# Patient Record
Sex: MALE | Race: BLACK OR AFRICAN AMERICAN | NOT HISPANIC OR LATINO | URBAN - METROPOLITAN AREA
[De-identification: names, ages, dates, MRNs, and addresses within clinical notes are randomized per-mention and may not be internally consistent; named-entity substitution may affect disease eponyms.]

---

## 2022-01-08 ENCOUNTER — OFFICE VISIT (OUTPATIENT)
Dept: URGENT CARE | Facility: CLINIC | Age: 62
End: 2022-01-08
Payer: COMMERCIAL

## 2022-01-08 VITALS
WEIGHT: 210 LBS | OXYGEN SATURATION: 100 % | RESPIRATION RATE: 18 BRPM | DIASTOLIC BLOOD PRESSURE: 94 MMHG | BODY MASS INDEX: 29.4 KG/M2 | SYSTOLIC BLOOD PRESSURE: 140 MMHG | TEMPERATURE: 97 F | HEART RATE: 78 BPM | HEIGHT: 71 IN

## 2022-01-08 DIAGNOSIS — H57.89 RED EYE: Primary | ICD-10-CM

## 2022-01-08 DIAGNOSIS — H11.32 SUBCONJUNCTIVAL HEMORRHAGE OF LEFT EYE: ICD-10-CM

## 2022-01-08 PROCEDURE — 1159F PR MEDICATION LIST DOCUMENTED IN MEDICAL RECORD: ICD-10-PCS | Mod: CPTII,S$GLB,, | Performed by: NURSE PRACTITIONER

## 2022-01-08 PROCEDURE — 1160F RVW MEDS BY RX/DR IN RCRD: CPT | Mod: CPTII,S$GLB,, | Performed by: NURSE PRACTITIONER

## 2022-01-08 PROCEDURE — 99204 OFFICE O/P NEW MOD 45 MIN: CPT | Mod: S$GLB,,, | Performed by: NURSE PRACTITIONER

## 2022-01-08 PROCEDURE — 3080F DIAST BP >= 90 MM HG: CPT | Mod: CPTII,S$GLB,, | Performed by: NURSE PRACTITIONER

## 2022-01-08 PROCEDURE — 3008F BODY MASS INDEX DOCD: CPT | Mod: CPTII,S$GLB,, | Performed by: NURSE PRACTITIONER

## 2022-01-08 PROCEDURE — 1159F MED LIST DOCD IN RCRD: CPT | Mod: CPTII,S$GLB,, | Performed by: NURSE PRACTITIONER

## 2022-01-08 PROCEDURE — 3077F PR MOST RECENT SYSTOLIC BLOOD PRESSURE >= 140 MM HG: ICD-10-PCS | Mod: CPTII,S$GLB,, | Performed by: NURSE PRACTITIONER

## 2022-01-08 PROCEDURE — 3080F PR MOST RECENT DIASTOLIC BLOOD PRESSURE >= 90 MM HG: ICD-10-PCS | Mod: CPTII,S$GLB,, | Performed by: NURSE PRACTITIONER

## 2022-01-08 PROCEDURE — 3077F SYST BP >= 140 MM HG: CPT | Mod: CPTII,S$GLB,, | Performed by: NURSE PRACTITIONER

## 2022-01-08 PROCEDURE — 1160F PR REVIEW ALL MEDS BY PRESCRIBER/CLIN PHARMACIST DOCUMENTED: ICD-10-PCS | Mod: CPTII,S$GLB,, | Performed by: NURSE PRACTITIONER

## 2022-01-08 PROCEDURE — 3008F PR BODY MASS INDEX (BMI) DOCUMENTED: ICD-10-PCS | Mod: CPTII,S$GLB,, | Performed by: NURSE PRACTITIONER

## 2022-01-08 PROCEDURE — 99204 PR OFFICE/OUTPT VISIT, NEW, LEVL IV, 45-59 MIN: ICD-10-PCS | Mod: S$GLB,,, | Performed by: NURSE PRACTITIONER

## 2022-01-08 RX ORDER — BENAZEPRIL HYDROCHLORIDE 40 MG/1
1 TABLET ORAL DAILY
COMMUNITY
Start: 2022-01-03

## 2022-01-08 RX ORDER — AMLODIPINE BESYLATE 10 MG/1
1 TABLET ORAL DAILY
COMMUNITY
Start: 2022-01-04 | End: 2023-01-04

## 2022-01-08 RX ORDER — BENAZEPRIL HYDROCHLORIDE 40 MG/1
40 TABLET ORAL DAILY
COMMUNITY
Start: 2021-12-20

## 2022-01-08 NOTE — PROGRESS NOTES
"Subjective:       Patient ID: Madan Alfonso is a 61 y.o. male.    Vitals:  height is 5' 11" (1.803 m) and weight is 95.3 kg (210 lb). His temperature is 97 °F (36.1 °C). His blood pressure is 140/94 (abnormal) and his pulse is 78. His respiration is 18 and oxygen saturation is 100%.     Chief Complaint: Eye Pain    Left eye, watery eyes, denies discharge     Eye Pain   The left eye is affected. This is a new problem. The current episode started yesterday. The problem occurs constantly. The injury mechanism was a foreign body. The pain is at a severity of 4/10. There is no known exposure to pink eye. He does not wear contacts. Pertinent negatives include no blurred vision, eye discharge, double vision, eye redness, fever, foreign body sensation, itching, nausea, photophobia, recent URI or vomiting. He has tried nothing for the symptoms. The treatment provided no relief.       Constitution: Negative for fever.   Eyes: Positive for eye pain. Negative for eye discharge, eye itching, eye redness, photophobia, double vision and blurred vision.   Gastrointestinal: Negative for nausea and vomiting.         History reviewed. No pertinent past medical history.       Social History     Socioeconomic History    Marital status:    Tobacco Use    Smoking status: Never Smoker    Smokeless tobacco: Never Used        History reviewed. No pertinent family history.    ROS:  GENERAL: No fever, chills, fatigability or weight loss.  SKIN: No rashes, itching or changes in color or texture of skin.  HEENT:  Left red eye, No headaches or recent head trauma. Visual acuity fine. No photophobia, ocular pain or diplopia. Denies ear pain, discharge or vertigo. No loss of smell, no epistaxis or postnasal drip. No hoarseness or change in voice.   NODES: No masses or lesions. Denies swollen glands.  CHEST: Denies cyanosis, wheezing, cough and sputum production.  CARDIOVASCULAR: Denies chest pain, PND, orthopnea or reduced exercise " tolerance.  ABDOMEN: Appetite fine. No weight loss. Denies diarrhea, abdominal pain,  MUSCULOSKELETAL: No joint stiffness or swelling. Denies back pain.  NEUROLOGIC: No history of seizures, paralysis, alteration of gait or coordination.  PSYCHIATRIC: Denies mood swings, depression or suicidal thoughts.    PE:   APPEARANCE: Well nourished, well developed, in no acute distress.   V/S: Reviewed.  SKIN: Normal skin turgor, no lesions.  HEENT: turbinates pink, pink pharynx, TM's clear bilateral.  Right sclera minimal redness, left sclera large redness/subconjunctival hemorrhage  CHEST:  No respiratory symptoms  CARDIOVASCULAR: Regular rate and rhythm   MUSCULOSKELETAL: Motor: 5/5 strength major flexors/extensors.  NEUROLOGIC: No sensory deficits. Gait & Posture: Normal gait and fine motion. No cerebellar signs.  MENTAL STATUS: Patient alert, oriented x 3 & conversant.    PLAN:    Consult Opthalmology  Advise use of eye protection  Advise increase p.o. fluids-- water/juice & rest  Practice good handwashing.  Tylenol for fever, headache and body aches.  Advise follow up with PCP in 2- 3 days for recheck  Advise go to ER if nausea, vomiting, fever, increased pain, or fail to improve with treatment.  AVS provided and reviewed with patient including supportive care, follow up, and red flag symptoms.   Patient verbalizes understanding and agrees with treatment plan. Discharged from Urgent Care in stable condition.      DIAGNOSIS:  Left red eye   Hypertension  Subconjunctival hemorrhage

## 2022-01-08 NOTE — PATIENT INSTRUCTIONS
PLAN:    Consult Opthalmology  Advise use of eye protection  Advise increase p.o. fluids-- water/juice & rest  Practice good handwashing.  Tylenol for fever, headache and body aches.  Advise follow up with PCP in 2- 3 days for recheck  Advise go to ER if nausea, vomiting, fever, increased pain, or fail to improve with treatment.  AVS provided and reviewed with patient including supportive care, follow up, and red flag symptoms.   Patient verbalizes understanding and agrees with treatment plan. Discharged from Urgent Care in stable condition.